# Patient Record
(demographics unavailable — no encounter records)

---

## 2017-01-01 NOTE — RO
DATE OF PROCEDURE:  2017

 

PREOPERATIVE DIAGNOSIS:  Circumcision.

 

POSTOPERATIVE DIAGNOSIS:  Circumcision.

 

OPERATION PROPOSED:  Circumcision.

 

OPERATION PERFORMED:  Circumcision.

 

SURGEON:  Cliff Suero MD

 

ASSISTANT:

 

ANESTHESIA:  Penile block, 1% Xylocaine, 5 mL.

 

ESTIMATED BLOOD LOSS:  Less than 1 mL.

 

DESCRIPTION OF PROCEDURE:  After adequate time out, penile block 1% Xylocaine 5

mL, circumcision was performed with a 1.3 Gomco bell.  Hemostasis was secured.

Vaseline was applied to penis and diaper and the patient was taken back to the

mother with discharge instructions.

 

Edited 2017 @ 0654 [facility]

## 2017-01-01 NOTE — NBADM
[place] Admission Note


Date of Admission


2017 at 15:46





History


This is a baby boy born at 39 and 5 weeks of gestational age via  for 

breech position to a 30-year-old  (G) 4 para (P) 0-0-3-0 mother who is 

blood type B positive, hepatitis B negative, rapid plasma reagin (RPR) negative

, HIV negative, group B Streptococcus negative. Baby cried at birth. Apgar 

scores were 7 at one minute and 9 at five minutes. Baby was admitted to the 

Mother-Baby unit.





Physical Examination


Physical Measurements


On admission, the baby's weight is 3730 grams, length is 53 cm, and head 

circumference is 36 cm.


Vital Signs





Vital Signs








  Date Time  Temp Pulse Resp B/P (MAP) Pulse Ox O2 Delivery O2 Flow Rate FiO2


 


17 16:20  150 68   Room Air  


 


17 16:50 98.9   57/31 (40)    








General:  Positive: Active, 


   Negative: Respiratory Distress, Dysmorphic Features


HEENT:  Positive: Normocephalic, Anterior [place] Open, Positive Red 

Reflexes Kings, Nares Patent, Ears Well Formed, Ears Well Set, 


   Negative: Cleft Lip, Cleft Palate


Heart:  Positive: S1,S2, 


   Negative: Murmur


Lungs:  Positive: Good Bilateral Air Entry, 


   Negative: Grunting and Retractions, Tachypnea


Abdomen:  Positive: Soft, 


   Negative: Distended


Male Genitalia:  Positive: Nl Term Male Genitalia


Anus:  Positive: Patent


Extremities:  Positive: Full ROM Times 4, Femoral Pulses, 


   Negative: Hip Click


Skin:  Positive: Normal for Gestation, Normal Capillary Refill


Neurological:  POSITIVE: Good Tone, Positive [place] Reflex, Positive Suck Reflex, 

Positive Grasp Reflex





Asessment


Problems:  


(1) Liveborn by 





Plan


1. Admit to mother-baby unit.


2. Routine  care.


3. Parents updated on condition and plan for the baby.











SAÚL ACOSTA DO 2017 12:14

## 2017-01-01 NOTE — DS.PDOC
[place] Discharge Summary


General


Date of Birth


17


Date of Discharge


2017





Problem List


Problems:  


(1) Liveborn by 





Procedures During Visit


Circumcision, Hearing screen and BiliChek were performed.





History


This is a baby boy born at 39 and 5 weeks of gestational age via  for 

breech position to a 30-year-old  (G) 4 para (P) 0-0-3-0 mother who is 

blood type B positive, hepatitis B negative, rapid plasma reagin (RPR) negative

, HIV negative, group B Streptococcus negative. Baby cried at birth. Apgar 

scores were 7 at one minute and 9 at five minutes. Baby was admitted to the 

Mother-Baby unit.





Exam on Admission to Nursery


Measurements on Admission


On admission, the baby's weight is 3730 grams, length is 53 cm, and head 

circumference is 36 cm.


General:  Positive: Active, 


   Negative: Respiratory Distress, Dysmorphic Features


HEENT:  Positive: Normocephalic, Anterior [place] Open, Positive Red 

Reflexes Kings, Nares Patent, Ears Well Formed, Ears Well Set, 


   Negative: Cleft Lip, Cleft Palate


Heart:  Positive: S1,S2, 


   Negative: Murmur


Lungs:  Positive: Good Bilateral Air Entry, 


   Negative: Grunting and Retractions, Tachypnea


Abdomen:  Positive: Soft, 


   Negative: Distended


Male Genitalia:  Positive: Nl Term Male Genitalia


Anus:  Positive: Patent


Extremities:  Positive: Full ROM Times 4, Femoral Pulses, 


   Negative: Hip Click


Skin:  Positive: Normal for Gestation, Normal Capillary Refill


Neurological:  POSITIVE: Good Tone, Positive [place] Reflex, Positive Suck Reflex, 

Positive Grasp Reflex





Summary Text


On the day of discharge, the baby's weight is 3478 grams and the baby is breast-

feeding well ad karyn.


Physical Examination was within normal limits and circumcision is healing well, 

continue to apply Vaseline as directed.


The baby passed a hearing screen, received the first dose of hepatitis B 

vaccine on 17.  Bilirubin check is 8.4 at 48 hours of life.


Discharge baby home with mother, followup as scheduled by parents with Judi [facility].











SÚAL ACOSTA DO 2017 11:21